# Patient Record
Sex: FEMALE | Race: WHITE | NOT HISPANIC OR LATINO | URBAN - METROPOLITAN AREA
[De-identification: names, ages, dates, MRNs, and addresses within clinical notes are randomized per-mention and may not be internally consistent; named-entity substitution may affect disease eponyms.]

---

## 2021-01-07 ENCOUNTER — EMERGENCY (EMERGENCY)
Facility: HOSPITAL | Age: 26
LOS: 0 days | Discharge: HOME | End: 2021-01-07
Attending: STUDENT IN AN ORGANIZED HEALTH CARE EDUCATION/TRAINING PROGRAM
Payer: MEDICAID

## 2021-01-07 VITALS
SYSTOLIC BLOOD PRESSURE: 134 MMHG | DIASTOLIC BLOOD PRESSURE: 83 MMHG | OXYGEN SATURATION: 99 % | RESPIRATION RATE: 18 BRPM | WEIGHT: 130.07 LBS | HEART RATE: 103 BPM | TEMPERATURE: 98 F | HEIGHT: 58 IN

## 2021-01-07 DIAGNOSIS — R45.1 RESTLESSNESS AND AGITATION: ICD-10-CM

## 2021-01-07 DIAGNOSIS — T42.4X1A POISONING BY BENZODIAZEPINES, ACCIDENTAL (UNINTENTIONAL), INITIAL ENCOUNTER: ICD-10-CM

## 2021-01-07 LAB
ANION GAP SERPL CALC-SCNC: 6 MMOL/L — SIGNIFICANT CHANGE UP (ref 5–17)
APAP SERPL-MCNC: < 2 UG/ML (ref 10–30)
BASOPHILS # BLD AUTO: 0.02 K/UL — SIGNIFICANT CHANGE UP (ref 0–0.2)
BASOPHILS NFR BLD AUTO: 0.3 % — SIGNIFICANT CHANGE UP (ref 0–2)
BUN SERPL-MCNC: 13 MG/DL — SIGNIFICANT CHANGE UP (ref 7–23)
CALCIUM SERPL-MCNC: 9.1 MG/DL — SIGNIFICANT CHANGE UP (ref 8.5–10.1)
CHLORIDE SERPL-SCNC: 106 MMOL/L — SIGNIFICANT CHANGE UP (ref 96–108)
CO2 SERPL-SCNC: 26 MMOL/L — SIGNIFICANT CHANGE UP (ref 22–31)
CREAT SERPL-MCNC: 0.69 MG/DL — SIGNIFICANT CHANGE UP (ref 0.5–1.3)
EOSINOPHIL # BLD AUTO: 0.05 K/UL — SIGNIFICANT CHANGE UP (ref 0–0.5)
EOSINOPHIL NFR BLD AUTO: 0.8 % — SIGNIFICANT CHANGE UP (ref 0–6)
ETHANOL SERPL-MCNC: <10 MG/DL — SIGNIFICANT CHANGE UP (ref 0–10)
FLUAV AG NPH QL: SIGNIFICANT CHANGE UP COUNTS
FLUBV AG NPH QL: SIGNIFICANT CHANGE UP COUNTS
GLUCOSE SERPL-MCNC: 81 MG/DL — SIGNIFICANT CHANGE UP (ref 70–99)
HCG SERPL-ACNC: <1 MIU/ML — SIGNIFICANT CHANGE UP
HCT VFR BLD CALC: 35.3 % — SIGNIFICANT CHANGE UP (ref 34.5–45)
HGB BLD-MCNC: 11.6 G/DL — SIGNIFICANT CHANGE UP (ref 11.5–15.5)
IMM GRANULOCYTES NFR BLD AUTO: 0.2 % — SIGNIFICANT CHANGE UP (ref 0–1.5)
LYMPHOCYTES # BLD AUTO: 1.41 K/UL — SIGNIFICANT CHANGE UP (ref 1–3.3)
LYMPHOCYTES # BLD AUTO: 22.1 % — SIGNIFICANT CHANGE UP (ref 13–44)
MCHC RBC-ENTMCNC: 29 PG — SIGNIFICANT CHANGE UP (ref 27–34)
MCHC RBC-ENTMCNC: 32.9 GM/DL — SIGNIFICANT CHANGE UP (ref 32–36)
MCV RBC AUTO: 88.3 FL — SIGNIFICANT CHANGE UP (ref 80–100)
MONOCYTES # BLD AUTO: 0.25 K/UL — SIGNIFICANT CHANGE UP (ref 0–0.9)
MONOCYTES NFR BLD AUTO: 3.9 % — SIGNIFICANT CHANGE UP (ref 2–14)
NEUTROPHILS # BLD AUTO: 4.65 K/UL — SIGNIFICANT CHANGE UP (ref 1.8–7.4)
NEUTROPHILS NFR BLD AUTO: 72.7 % — SIGNIFICANT CHANGE UP (ref 43–77)
NRBC # BLD: 0 /100 WBCS — SIGNIFICANT CHANGE UP (ref 0–0)
PLATELET # BLD AUTO: 184 K/UL — SIGNIFICANT CHANGE UP (ref 150–400)
POTASSIUM SERPL-MCNC: 3.9 MMOL/L — SIGNIFICANT CHANGE UP (ref 3.5–5.3)
POTASSIUM SERPL-SCNC: 3.9 MMOL/L — SIGNIFICANT CHANGE UP (ref 3.5–5.3)
RBC # BLD: 4 M/UL — SIGNIFICANT CHANGE UP (ref 3.8–5.2)
RBC # FLD: 13.6 % — SIGNIFICANT CHANGE UP (ref 10.3–14.5)
RSV RNA NPH QL NAA+NON-PROBE: SIGNIFICANT CHANGE UP COUNTS
SALICYLATES SERPL-MCNC: <1.7 MG/DL — LOW (ref 2.8–20)
SARS-COV-2 RNA SPEC QL NAA+PROBE: SIGNIFICANT CHANGE UP COUNTS
SODIUM SERPL-SCNC: 138 MMOL/L — SIGNIFICANT CHANGE UP (ref 135–145)
TSH SERPL-MCNC: 0.85 UIU/ML — SIGNIFICANT CHANGE UP (ref 0.36–3.74)
WBC # BLD: 6.39 K/UL — SIGNIFICANT CHANGE UP (ref 3.8–10.5)
WBC # FLD AUTO: 6.39 K/UL — SIGNIFICANT CHANGE UP (ref 3.8–10.5)

## 2021-01-07 PROCEDURE — 99284 EMERGENCY DEPT VISIT MOD MDM: CPT

## 2021-01-07 RX ORDER — HALOPERIDOL DECANOATE 100 MG/ML
5 INJECTION INTRAMUSCULAR ONCE
Refills: 0 | Status: COMPLETED | OUTPATIENT
Start: 2021-01-07 | End: 2021-01-07

## 2021-01-07 NOTE — ED PROVIDER NOTE - CLINICAL SUMMARY MEDICAL DECISION MAKING FREE TEXT BOX
26yo F BIBA d/t reported OD. AFVSS. Pt upset / tearful, but reasonable / calm after speaking w/ MD. Unremarkable exam. Not c/w benzo OD. Pt agrees to lab work, speak w/ psych. Pt partner (?  / boyfriend) called ED, provides collateral, admits no evidence that pt OD'd / took his medications. Will d/c pt.

## 2021-01-07 NOTE — ED ADULT NURSE NOTE - CHIEF COMPLAINT QUOTE
as per EMS pt took 10 Xanax tablets.  pt denies taking any medication. denies SI or HI. xanax prescribed for boyfriend who called 911. history of drug abuse. pt attempting to leave hospital. states she does not wish to be here. started on one to one observation .

## 2021-01-07 NOTE — ED PROVIDER NOTE - OBJECTIVE STATEMENT
24yo F BIBA d/t reported OD. Per EMS, boyfriend called and said pt took 10 of his prescribed xanax. Per pt,  is lying. Pt reports hx of drug abuse, klonopin / xanax but clean x 7yrs, reports currently   as he is cheating on her, she feels  did this to get back at her / get her out of the home so he can do what he wants. She reports stress d/t  cousin leaving yesterday to go to long-term x 7yrs. Pt reports caring for young child w/ COVID. Pt denies HI/SI/AH/VH. States he's lying and she shouldn't be here. Adamantly denies taking xanax. Pt states she can't call  b/c she doesn't know where he is right now. Pt refusing to give UA. Pt OK w/ blood work and speak w/ Psych.     PMH none, PSH none, NKDA, no meds. 24yo F BIBA d/t reported OD. Per EMS, boyfriend called and said pt took 10 of his prescribed xanax. Per pt,  is lying. Pt reports hx of drug abuse, klonopin / xanax but clean x 7yrs, reports currently   as he is cheating on her, she feels  did this to get back at her / get her out of the home so he can do what he wants. She reports stress d/t 's cousin leaving yesterday to go to snf x 7yrs. Pt reports caring for young child w/ COVID. Pt denies HI/SI/AH/VH. States he's lying and she shouldn't be here. Adamantly denies taking xanax. Pt states she can't call  b/c she doesn't know where he is right now. Pt refusing to give UA. Pt OK w/ blood work and speak w/ Psych.     PMH none, PSH none, NKDA, no meds.

## 2021-01-07 NOTE — ED PROVIDER NOTE - PROGRESS NOTE DETAILS
Spoke w/  Marcio , states he and pt were in argument this afternoon, she wasn't acting herself, so he assumed the worst that she had overdosed and called 911. He now admits he had no evidence of OD / she did not take any of his medications. Will d/c pt. Spoke w/  Marcio (), states he and pt were in argument this afternoon, she wasn't acting herself, so he assumed the worst that she had overdosed and called 911. He now admits he had no evidence of OD / she did not take any of his medications. Will d/c pt.

## 2021-01-07 NOTE — ED PROVIDER NOTE - PHYSICAL EXAMINATION
GEN: Awake, alert, interactive.   HEAD AND NECK: NC/AT. Airway patent. Neck supple.   EYES:  Clear b/l. EOMI. PERRL.   ENT: Moist mucus membranes.   CARDIAC: Regular rate, regular rhythm. No evident pedal edema.    RESP/CHEST: Normal respiratory effort with no use of accessory muscles or retractions. Clear throughout on auscultation.  ABD: Soft, non-distended, non-tender. No rebound, no guarding.   BACK: No midline spinal TTP. No CVAT.   EXTREMITIES: Moving all extremities with no apparent deformities.   SKIN: Warm, dry, intact normal color. No rash.   NEURO: AOx3, CN II-XII grossly intact, no focal deficits.   PSYCH: Tearful, upset.

## 2021-01-07 NOTE — ED PROVIDER NOTE - PATIENT PORTAL LINK FT
You can access the FollowMyHealth Patient Portal offered by [facility] by registering at the following website: [email or web address]. By joining [email or web address]’s FollowMyHealth portal, you will also be able to view your health information using other applications (apps) compatible with our system.

## 2021-01-07 NOTE — ED ADULT TRIAGE NOTE - CHIEF COMPLAINT QUOTE
as per EMS pt took 10 Xanax tablets.  pt denies taking any medication. denies SI or HI. xanax prescribed for boyfriend who called 911. denies any psych history. pt awake alert. pt attempting to leave hospital. states she does not wish to be here. started on one to one observation . as per EMS pt took 10 Xanax tablets.  pt denies taking any medication. denies SI or HI. xanax prescribed for boyfriend who called 911. history of drug abuse. pt attempting to leave hospital. states she does not wish to be here. started on one to one observation .

## 2021-01-07 NOTE — ED ADULT NURSE NOTE - CAS ELECT INFOMATION PROVIDED
pt discharged with follow up care instructions, ambulatory with steady gait at discharge./DC instructions

## 2021-01-08 LAB
SARS-COV-2 IGG SERPL QL IA: NEGATIVE — SIGNIFICANT CHANGE UP
SARS-COV-2 IGM SERPL IA-ACNC: 0.07 INDEX — SIGNIFICANT CHANGE UP

## 2022-05-25 ENCOUNTER — EMERGENCY (EMERGENCY)
Facility: HOSPITAL | Age: 27
LOS: 1 days | Discharge: HOME | End: 2022-05-25
Attending: EMERGENCY MEDICINE
Payer: MEDICAID

## 2022-05-25 VITALS
DIASTOLIC BLOOD PRESSURE: 79 MMHG | HEIGHT: 58 IN | WEIGHT: 130.07 LBS | SYSTOLIC BLOOD PRESSURE: 117 MMHG | RESPIRATION RATE: 18 BRPM | OXYGEN SATURATION: 100 % | TEMPERATURE: 98 F | HEART RATE: 75 BPM

## 2022-05-25 RX ORDER — ACETAMINOPHEN 500 MG
650 TABLET ORAL ONCE
Refills: 0 | Status: COMPLETED | OUTPATIENT
Start: 2022-05-25 | End: 2022-05-25

## 2022-05-25 RX ADMIN — Medication 650 MILLIGRAM(S): at 13:44

## 2022-05-25 NOTE — ED PROVIDER NOTE - NSFOLLOWUPCLINICS_GEN_ALL_ED_FT
[facility]  Orthopedic Surgery  [address], 3rd & 4th [place] [address]  Phone: [phone]  Fax:     [facility]  Orthopedic Surgery  [address] [address]  Phone: [phone]  Fax:

## 2022-05-25 NOTE — ED ADULT NURSE NOTE - NSIMPLEMENTINTERV_GEN_ALL_ED
Implemented All Fall Risk Interventions:  [place] to call system. Call bell, personal items and telephone within reach. Instruct patient to call for assistance. Room bathroom lighting operational. Non-slip footwear when patient is off stretcher. Physically safe environment: no spills, clutter or unnecessary equipment. Stretcher in lowest position, wheels locked, appropriate side rails in place. Provide visual cue, wrist band, yellow gown, etc. Monitor gait and stability. Monitor for mental status changes and reorient to person, place, and time. Review medications for side effects contributing to fall risk. Reinforce activity limits and safety measures with patient and family.

## 2022-05-25 NOTE — ED ADULT NURSE NOTE - OBJECTIVE STATEMENT
received pt via  wheel chair c/p r foot pain pt sts she had a mechanical fall yesterday  pt sts  to tough all over lateral worse than medial minimal swelling< 2 sec cap refil pt used ice  pt did not take any pain med.

## 2022-05-25 NOTE — ED PROVIDER NOTE - PROGRESS NOTE DETAILS
leg, ankle, foot XRs negative on our wet read, patient with improved pain with tylenol, still has trouble putting pressure on foot, so provided with crutches, will DC with ortho follow up    Will discharge. Discussed the case with patient and/or family.  Instructed urgent follow up with primary care doctor for review of their ED visit (labs, radiology, etc.) Also instructed follow up for any specialty services as needed. Patient and/or family are aware to return to ED with any new or worsening symptoms. All questions were answered and the opportunity for to ask questions were given. Patient and/or family verbalized understanding of the above instructions.

## 2022-05-25 NOTE — ED PROVIDER NOTE - CLINICAL SUMMARY MEDICAL DECISION MAKING FREE TEXT BOX
27yF w/ R ankle inversion injury, profuse tenderness to soft touch with minimal edema. + hyperesthesia on exam, likely ankle sprain vs fracture, neurovascular status intact, do not suspect compartment syndrome 27yF w/ R ankle inversion injury, profuse tenderness to soft touch with minimal edema. + hyperesthesia on exam, likely ankle sprain vs fracture, neurovascular status intact, do not suspect compartment syndrome    Attending Statement: Agree with the above.  Inversion ~24 hrs ago without deformities.  No clinical e/o compartment syndrome.  Pt with subjective paresthesias and hyperesthesias to entirety of foot but extremity is warm throughout and symmetric c LLE, DP/PT +2 symmetrically, CR <2 sec, compartments are soft, and there is no crepitation.  Very unlikely to represent vascular injury, comparetment syndrome, or nec fasc.  Will check XRs to eval for fx, likely splint with aircast, provide crutches and ortho f/u.  Pt states she has anaphylaxis to ibuprofen but has tolerated motrin in the past; will treat with tylenol and avoid all NSAIDs.  Reassess after imaging/meds.  --CARLOSM

## 2022-05-25 NOTE — ED PROVIDER NOTE - NSFOLLOWUPINSTRUCTIONS_ED_ALL_ED_FT
Advance activity as tolerated. Continue all previously prescribed medications as directed unless otherwise instructed.  Follow up with your primary care physician in 48-72 hours- bring copies of your results.     Please return to the ED if symptoms worsen, persist, or do not resolve, or if new symptoms develop, including, but not limited to, the following: fevers, chills, nausea, vomiting, chest pain, palpitations, "blackouts" (loss of consciousness), shortness of breath, wheezing, difficulty breathing, abdominal pain, back pain, trouble going to the bathroom, weakness, difficulty walking, headache, speech changes, visual changes, or numbness/tingling.     If you have issues obtaining follow up, please call: [phone] to obtain a doctor or specialist who takes your insurance in your area.  You may call [phone] to make an appointment with the internal medicine clinic.      Please follow with orthopedics

## 2022-05-25 NOTE — ED PROVIDER NOTE - NS ED ATTENDING STATEMENT MOD
This was a shared visit with the LILLIAN. I reviewed and verified the documentation and independently performed the documented:

## 2022-05-25 NOTE — ED PROVIDER NOTE - PATIENT PORTAL LINK FT
You can access the FollowMyHealth Patient Portal offered by [facility] by registering at the following website: [email or web address]. By joining [employer]’s FollowMyHealth portal, you will also be able to view your health information using other applications (apps) compatible with our system.

## 2022-05-25 NOTE — ED PROVIDER NOTE - PHYSICAL EXAMINATION
VS unremarkable. Physical Exam: adult, NAD, NCAT, PERRL, EOMI, neck supple, RRR, CTA B, Abd: s/nd/nt, Ext: no edema, Neuro: AAOx3, ambulates w/o diff, strength 5/5 & symmetric throughout.    RLE:  mild tenderness at prox fib, R ankle with no appreciable swelling, mild bruising anter to lat mal, + ttp along post lat mal and 5th metatarsal and navicular. ROM and str limited 2/2 pain, dorisflecion and plantarflexion mechanisms intact. wong test negative. subjective loss of sensation to soft touch of whole foot, no color, intact DPP and cap refill. VS unremarkable. Physical Exam: adult, NAD, NCAT, PERRL, EOMI, neck supple, RRR, CTA B, Abd: s/nd/nt, Ext: no edema, Neuro: AAOx3, ambulates w/o diff, strength 5/5 & symmetric throughout.    RLE:  mild tenderness at prox fib, R ankle with no appreciable swelling, mild bruising anter to lat mal, + ttp along all areas of the foot even to soft touch. ROM and str limited 2/2 pain, dorisflecion and plantarflexion mechanisms intact. wong test negative. subjective loss of sensation to soft touch of whole foot, no color, intact DPP & PTP and cap refill is the symmetric. RLE leg compartments are soft. Color on foot is symmetric

## 2022-05-25 NOTE — ED PROVIDER NOTE - OBJECTIVE STATEMENT
27y w/ hx of prior orthopedic surgery in R ankle, allergic to ASA    mechanical fall and trip with R ankle inversion yesterday, no loc or head strike, worsening edema over last night with worse pain, and now with numbness of the foot and toes. Attempted RICE with some help with edema, but no improvement in pain. Cannot bear weight 27y w/ hx of prior orthopedic surgery in R ankle (scar on posterior ankle), allergic to ASA (anaphylaxis)    mechanical fall and trip with R ankle inversion yesterday while coming down stairs, heard a crack, no loc or head strike, worsening edema over last night with worse pain, and now with numbness of the foot and toes. Attempted RICE with some help with edema, but no improvement in pain. Cannot bear weight at this time and has pain with movement of her toes. Also has some pain along the upper leg. 27y w/ hx of prior unspecified orthopedic surgery in R ankle (scar on posterior ankle), allergic to ASA (anaphylaxis)    mechanical fall and trip with R ankle inversion yesterday while coming down stairs, heard a crack, no loc or head strike, worsening edema over last night with worse pain, and now with numbness of the foot and toes. Attempted RICE with some help with edema, but no improvement in pain. Cannot bear weight at this time and has pain with movement of her toes. Also has some pain along the upper leg.

## 2022-05-25 NOTE — ED PROCEDURE NOTE - PROCEDURE ADDITIONAL DETAILS
crutches provided as well crutches provided as well    Attending Statement: Agree with the above.  --BMM

## 2023-02-08 NOTE — ED ADULT TRIAGE NOTE - PAIN RATING/NUMBER SCALE (0-10): REST
Next appt 4/12/2023   Last appt 12/28/22    Refill Requested / Last Refill Info:   Disp Refills Start End    meloxicam (MOBIC) 7.5 MG tablet 30 tablet 0 1/9/2023 2/8/2023    Sig - Route: TAKE 1 TABLET BY MOUTH DAILY AS NEEDED FOR PAIN - Oral        Refill unable to be completed per standing System's protocol due to: Patient was seen 12/28/22, please confirm if refill is appropriate.     Orders pended, and routed to provider for approval.   Please route any notes back to your nursing pool via patient call NOT Rx Auth.  Thank you, Refill Center Staff        Refill request for    Disp Refills Start End    omeprazole (PriLOSEC) 40 MG capsule 90 capsule 3 2/8/2023     Sig - Route: TAKE 1 CAPSULE BY MOUTH DAILY - Oral      Refilled per standing med protocol.     0